# Patient Record
Sex: MALE | Race: WHITE | Employment: FULL TIME | URBAN - METROPOLITAN AREA
[De-identification: names, ages, dates, MRNs, and addresses within clinical notes are randomized per-mention and may not be internally consistent; named-entity substitution may affect disease eponyms.]

---
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
## 2021-06-23 ENCOUNTER — OFFICE VISIT (OUTPATIENT)
Dept: FAMILY MEDICINE CLINIC | Age: 21
End: 2021-06-23
Payer: COMMERCIAL

## 2021-06-23 VITALS
BODY MASS INDEX: 27.2 KG/M2 | HEART RATE: 75 BPM | HEIGHT: 70 IN | SYSTOLIC BLOOD PRESSURE: 121 MMHG | DIASTOLIC BLOOD PRESSURE: 74 MMHG | WEIGHT: 190 LBS | OXYGEN SATURATION: 97 % | TEMPERATURE: 98.6 F

## 2021-06-23 DIAGNOSIS — J02.9 PHARYNGITIS, UNSPECIFIED ETIOLOGY: ICD-10-CM

## 2021-06-23 DIAGNOSIS — J02.9 SORE THROAT: Primary | ICD-10-CM

## 2021-06-23 PROCEDURE — 99213 OFFICE O/P EST LOW 20 MIN: CPT | Performed by: NURSE PRACTITIONER

## 2021-06-23 RX ORDER — LIDOCAINE HYDROCHLORIDE 20 MG/ML
SOLUTION ORAL; TOPICAL
COMMUNITY
Start: 2021-06-04 | End: 2021-11-16 | Stop reason: ALTCHOICE

## 2021-06-23 RX ORDER — AZITHROMYCIN 250 MG/1
TABLET, FILM COATED ORAL
Qty: 6 TABLET | Refills: 0 | Status: SHIPPED | OUTPATIENT
Start: 2021-06-23 | End: 2021-11-16 | Stop reason: ALTCHOICE

## 2021-06-23 NOTE — PROGRESS NOTES
TRUDI Trammell is a 22 y.o. male and presents today for Sore Throat (1 week)  About 2-1/2 weeks ago he was seen in an urgent care in the [facility] and diagnosed with strep. He states he had a positive RST in office there. He was treated with Augmentin, 10-day course. He states that he took the full course of antibiotics and feels like within 2 days his throat started hurting again, about the same as it did initially. Was also given lidocaine viscous solution and does not like it because it tastes terrible. He has no other complaints. He denies cough, congestion, fevers, ear pain. He said no sick contacts outside of himself. Recent Travel Screening and Travel History documentation   Travel Screening     Question   Response    In the last month, have you been in contact with someone who was confirmed or suspected to have Coronavirus / COVID-19? No / Unsure    Have you had a COVID-19 viral test in the last 14 days? No    Do you have any of the following new or worsening symptoms? Have you traveled internationally or domestically in the last month? No      Travel History   Travel since 05/23/21     No documented travel since 05/23/21          Screening  On the basis of positive PHQ-9 screening ( ), C-SSRS completed. Patient will follow-up as needed/as directed with PCP. Allergies  No Known Allergies     Medications  Current Outpatient Medications   Medication Sig Dispense    Lidocaine Viscous 2 % solution      azithromycin (ZITHROMAX) 250 mg tablet Take 2 tablets today, then take 1 tablet daily 6 Tablet    Eliquis 5 mg tablet Take 1 Tab by mouth two (2) times a day.  ibuprofen (MOTRIN) 200 mg tablet Take 2 Tabs by mouth every four to six (4-6) hours as needed.  bacitracin zinc (BACITRACIN) ointment Apply  to affected area daily. (Patient not taking: Reported on 6/23/2021) 15 g     No current facility-administered medications for this visit.         Problem List  Patient Active Problem List    Diagnosis Date Noted    AVM (arteriovenous malformation) 08/05/2020        Vitals  Visit Vitals  /74   Pulse 75   Temp 98.6 °F (37 °C)   Ht 5' 10\" (1.778 m)   Wt 190 lb (86.2 kg)   SpO2 97%   BMI 27.26 kg/m²        ROS  Review of Systems   Constitutional: Negative for chills, fever and malaise/fatigue. HENT: Positive for sore throat. Negative for congestion, ear pain and sinus pain. Eyes: Negative for blurred vision and redness. Respiratory: Negative for cough, sputum production, shortness of breath and wheezing. Cardiovascular: Negative for chest pain, palpitations and leg swelling. Gastrointestinal: Negative for abdominal pain, constipation, diarrhea, heartburn, nausea and vomiting. Genitourinary: Negative for dysuria and hematuria. Musculoskeletal: Negative for falls, joint pain and myalgias. Skin: Negative for rash. Neurological: Negative for dizziness, tingling, tremors, seizures, weakness and headaches. Endo/Heme/Allergies: Does not bruise/bleed easily. Psychiatric/Behavioral: Negative for depression, hallucinations, memory loss and suicidal ideas. The patient is not nervous/anxious and does not have insomnia. Physical Exam  Physical Exam  Vitals and nursing note reviewed. Constitutional:       General: He is awake. He is not in acute distress. Appearance: Normal appearance. He is not ill-appearing or toxic-appearing. HENT:      Head: Normocephalic and atraumatic. Right Ear: Tympanic membrane, ear canal and external ear normal. There is no impacted cerumen. Left Ear: Tympanic membrane, ear canal and external ear normal. There is no impacted cerumen. Nose: Nose normal. No congestion or rhinorrhea. Right Sinus: No maxillary sinus tenderness or frontal sinus tenderness. Left Sinus: No maxillary sinus tenderness or frontal sinus tenderness.       Mouth/Throat:      Mouth: Mucous membranes are moist.      Pharynx: Oropharyngeal exudate and posterior oropharyngeal erythema present. Tonsils: 1+ on the right. 1+ on the left. Eyes:      General:         Right eye: No discharge. Left eye: No discharge. Extraocular Movements: Extraocular movements intact. Conjunctiva/sclera: Conjunctivae normal.   Cardiovascular:      Rate and Rhythm: Normal rate and regular rhythm. Pulses: Normal pulses. Heart sounds: Normal heart sounds. Pulmonary:      Effort: Pulmonary effort is normal. No respiratory distress. Breath sounds: Normal breath sounds. No stridor. No wheezing, rhonchi or rales. Chest:      Chest wall: No tenderness. Musculoskeletal:         General: No swelling. Normal range of motion. Cervical back: Normal range of motion and neck supple. Right lower leg: No edema. Left lower leg: No edema. Skin:     General: Skin is warm and dry. Capillary Refill: Capillary refill takes less than 2 seconds. Coloration: Skin is not jaundiced or pale. Findings: No bruising, erythema, lesion or rash. Neurological:      General: No focal deficit present. Mental Status: He is alert and oriented to person, place, and time. Cranial Nerves: No cranial nerve deficit. Motor: No weakness. Gait: Gait normal.   Psychiatric:         Mood and Affect: Mood normal.         Behavior: Behavior normal.         Thought Content: Thought content normal.         Judgment: Judgment normal.          Assessment/Plan  1. Sore throat  In office RST negative; his throat is very red and there is drainage noted around his tonsils. I am going to obtain an additional swab and send off for strep culture. We will call with results  - AMB POC RAPID STREP A  - STREP AG SCREEN, GROUP A    2. Pharyngitis, unspecified etiology  Because of his recent strep infection, I am going to go ahead and start him on an additional antibiotic. He already had Augmentin. We will try azithromycin.   We will see what his strep culture says. Home hygiene discussed. May use Tylenol as per package instructions for throat pain. He may also use do warm salt water gargles, lozenges or Chloraseptic spray. Also asked patient to increase fluids. - azithromycin (ZITHROMAX) 250 mg tablet; Take 2 tablets today, then take 1 tablet daily  Dispense: 6 Tablet; Refill: 0       Reviewed with him/her that COVID-19 pandemic is an evolving situation with rapidly changing recommendations & guidelines. Medical decisions are made based on the the best information available at the time. Recommended he/she stay tuned for updates published by trusted sources and to advise your PCP of any unexpected changes in clinical condition    Disclaimer:  I have discussed the diagnosis with the patient and the intended plan as seen above. The patient understands our medical plan. The risks, benefits and significant side effects of all medications have been reviewed. Anticipated time course and progression of condition reviewed. All questions have been addressed. He/She received an after visit summary, with information reviewed, and questions answered. Where appropriate, he/she is instructed to call the clinic if he/she has not been notified either by phone or through [address] with test results. The patient  is to call if his condition worsens or fails to improve or if significant side effects are experienced.        Shanta Chirinos, [facility]

## 2021-06-23 NOTE — PROGRESS NOTES
Shana Boone presents today for   Chief Complaint   Patient presents with    Sore Throat     1 week       Is someone accompanying this pt? No        Is the patient using any DME equipment during OV? no    Depression Screening:  3 most recent PHQ Screens 6/23/2021   Little interest or pleasure in doing things Not at all   Feeling down, depressed, irritable, or hopeless Not at all   Total Score PHQ 2 0       Learning Assessment:  Learning Assessment 8/5/2020   PRIMARY LEARNER Patient   HIGHEST LEVEL OF EDUCATION - PRIMARY LEARNER  SOME COLLEGE   BARRIERS PRIMARY LEARNER NONE   CO-LEARNER CAREGIVER No   PRIMARY LANGUAGE ENGLISH   LEARNER PREFERENCE PRIMARY LISTENING   ANSWERED BY patient   RELATIONSHIP SELF         Health Maintenance reviewed and discussed and ordered per Provider. Health Maintenance Due   Topic Date Due    Hepatitis C Screening  Never done    DTaP/Tdap/Td series (1 - Tdap) Never done    HPV Age 9Y-34Y (3 - Male 2-dose series) Never done    COVID-19 Vaccine (1) Never done   . Coordination of Care:  1. Have you been to the ER, urgent care clinic since your last visit? Hospitalized since your last visit? no    2. Have you seen or consulted any other health care providers outside of the [address] [place] [address] since your last visit? Include any pap smears or colon screening.  no

## 2021-06-28 LAB — SPECIMEN STATUS REPORT, ROLRST: NORMAL

## 2021-11-16 ENCOUNTER — OFFICE VISIT (OUTPATIENT)
Dept: INTERNAL MEDICINE CLINIC | Age: 21
End: 2021-11-16
Payer: COMMERCIAL

## 2021-11-16 VITALS
TEMPERATURE: 98.9 F | RESPIRATION RATE: 18 BRPM | OXYGEN SATURATION: 96 % | WEIGHT: 172.2 LBS | HEART RATE: 101 BPM | HEIGHT: 70 IN | SYSTOLIC BLOOD PRESSURE: 131 MMHG | DIASTOLIC BLOOD PRESSURE: 73 MMHG | BODY MASS INDEX: 24.65 KG/M2

## 2021-11-16 DIAGNOSIS — F90.0 ATTENTION DEFICIT HYPERACTIVITY DISORDER (ADHD), PREDOMINANTLY INATTENTIVE TYPE: Primary | ICD-10-CM

## 2021-11-16 DIAGNOSIS — Z72.0 TOBACCO ABUSE: ICD-10-CM

## 2021-11-16 PROCEDURE — 99213 OFFICE O/P EST LOW 20 MIN: CPT | Performed by: INTERNAL MEDICINE

## 2021-11-16 NOTE — PROGRESS NOTES
Patient states that in morning when he wakes up his mind is going, states he can't sleep at night because he is thinking about what he has to do the next day. States he feels like he may have ADHD. States he can not slow his mind down and relax. Ayan Kulkarni presents today for racing thoughts, lack of focus    Is someone accompanying this pt? no  Is the patient using any DME equipment during [address]? no    Depression Screening:  3 most recent PHQ Screens 11/16/2021   Little interest or pleasure in doing things Not at all   Feeling down, depressed, irritable, or hopeless Not at all   Total Score PHQ 2 0       Learning Assessment:  Learning Assessment 8/5/2020   PRIMARY LEARNER Patient   HIGHEST LEVEL OF EDUCATION - PRIMARY LEARNER  SOME COLLEGE   BARRIERS PRIMARY LEARNER NONE   CO-LEARNER CAREGIVER No   PRIMARY LANGUAGE ENGLISH   LEARNER PREFERENCE PRIMARY LISTENING   ANSWERED BY patient   RELATIONSHIP SELF         Health Maintenance reviewed and discussed and ordered per Provider. Health Maintenance Due   Topic Date Due    Hepatitis C Screening  Never done    DTaP/Tdap/Td series (1 - Tdap) Never done    HPV Age 9Y-34Y (3 - Male 2-dose series) Never done    COVID-19 Vaccine (1) Never done    Flu Vaccine (1) 09/01/2021   . Coordination of Care:  1. \"Have you been to the ER, urgent care clinic since your last visit? Hospitalized since your last visit? \" No    2. \"Have you seen or consulted any other health care providers outside of the [address] [place] [address] since your last visit? \" No

## 2021-11-16 NOTE — PROGRESS NOTES
1. Attention deficit hyperactivity disorder (ADHD), predominantly inattentive type  He has all the symptoms of ADHD with inattentive symptoms. However given his age and background I would like to have him formally evaluated by psychiatry. I think this would be most appropriate given his age. We are placing a consult with psychiatry in [address]    2. Tobacco abuse  Encouraged cessation    11/4/21 Dr. Mitchell Mcardle note reviewed  Cc: lack of attention     HPI   This is a very pleasant 52-year-old gentleman with a history of AVMs/KTS who presents complaining of 1 year of inattentive symptoms. He reports sometimes he will just obsess about things and lose track of what he was doing. He reports sometimes he will go to bed and he just keeps thinking about things over and over again until he falls asleep. He denies being depressed. He denies SI or HI. He denies any headaches or new vision changes as well as no dizziness. He has no chest pain palpitations or shortness of breath. He reports he thinks this may have gone on even before a year ago but he is not really sure. Patient Active Problem List   Diagnosis Code    AVM (arteriovenous malformation) Q27.30        Current Outpatient Medications on File Prior to Visit   Medication Sig Dispense Refill    Eliquis 5 mg tablet Take 1 Tab by mouth two (2) times a day.  ibuprofen (MOTRIN) 200 mg tablet Take 2 Tabs by mouth every four to six (4-6) hours as needed. No current facility-administered medications on file prior to visit.         ROS  - GEN: no weight gain/loss, no fevers or chills  - HEENT: no vision changes, no tinnitus, no sore throat    - Neuro: no resting tremors, parasthesia in extremities, no headaches  - MS: No weakness in extremities, no gait abnormalities  - Psych: negative for depression or anxiety + inattentiveness      Visit Vitals  /73   Pulse (!) 101   Temp 98.9 °F (37.2 °C)   Resp 18   Ht 5' 10\" (1.778 m) Wt 172 lb 3.2 oz (78.1 kg)   SpO2 96%   BMI 24.71 kg/m²           Physical Exam  Constitutional:       Appearance: Normal appearance. Normal weight. NAD and pleasant  HENT:      Head: Normocephalic. Nose: Nose normal.      Mouth/Throat:     Eyes:      Extraocular Movements: Extraocular movements intact. Conjunctiva/sclera: Conjunctivae normal. Sclera anicteric      Cardiovascular:      Rate and Rhythm: Normal rate and regular rhythm. Pulses: Normal pulses. Pulmonary:      Effort: No respiratory distress. Breath sounds: CTAB and No stridor. No rhonchi.    Psychiatry     Calm, normal affect, interacting normally

## 2022-03-18 PROBLEM — Q27.30 AVM (ARTERIOVENOUS MALFORMATION): Status: ACTIVE | Noted: 2020-08-05

## 2022-07-07 ENCOUNTER — HOSPITAL ENCOUNTER (EMERGENCY)
Age: 22
Discharge: HOME | End: 2022-07-08
Attending: EMERGENCY MEDICINE
Payer: COMMERCIAL

## 2022-07-07 DIAGNOSIS — N30.00 ACUTE CYSTITIS WITHOUT HEMATURIA: Primary | ICD-10-CM

## 2022-07-07 DIAGNOSIS — M54.50 ACUTE BILATERAL LOW BACK PAIN WITHOUT SCIATICA: ICD-10-CM

## 2022-07-07 LAB
APPEARANCE UR: CLEAR
BILIRUB UR QL: NEGATIVE
COLOR UR: YELLOW
GLUCOSE UR STRIP.AUTO-MCNC: NEGATIVE MG/DL
HGB UR QL STRIP: ABNORMAL
KETONES UR QL STRIP.AUTO: NEGATIVE MG/DL
LEUKOCYTE ESTERASE UR QL STRIP.AUTO: NEGATIVE
NITRITE UR QL STRIP.AUTO: NEGATIVE
PH UR STRIP: 6.5 [PH] (ref 5–8)
PROT UR STRIP-MCNC: NEGATIVE MG/DL
SP GR UR REFRACTOMETRY: 1.01 (ref 1–1.03)
UROBILINOGEN UR QL STRIP.AUTO: 1 EU/DL (ref 0.2–1)

## 2022-07-07 PROCEDURE — 99283 EMERGENCY DEPT VISIT LOW MDM: CPT

## 2022-07-07 PROCEDURE — 81001 URINALYSIS AUTO W/SCOPE: CPT

## 2022-07-07 PROCEDURE — 87086 URINE CULTURE/COLONY COUNT: CPT

## 2022-07-08 VITALS
BODY MASS INDEX: 25.77 KG/M2 | RESPIRATION RATE: 16 BRPM | HEART RATE: 74 BPM | WEIGHT: 180 LBS | OXYGEN SATURATION: 98 % | DIASTOLIC BLOOD PRESSURE: 72 MMHG | TEMPERATURE: 98.9 F | SYSTOLIC BLOOD PRESSURE: 118 MMHG | HEIGHT: 70 IN

## 2022-07-08 LAB
BACTERIA URNS QL MICRO: SLIGHT /HPF
EPITH CASTS URNS QL MICRO: NORMAL /LPF (ref 0–20)
RBC #/AREA URNS HPF: NORMAL /HPF (ref 0–2)
WBC URNS QL MICRO: NORMAL /HPF (ref 0–4)

## 2022-07-08 PROCEDURE — 74011000250 HC RX REV CODE- 250: Performed by: EMERGENCY MEDICINE

## 2022-07-08 PROCEDURE — 74011250637 HC RX REV CODE- 250/637: Performed by: EMERGENCY MEDICINE

## 2022-07-08 RX ORDER — LIDOCAINE 4 G/100G
1 PATCH TOPICAL EVERY 24 HOURS
Status: DISCONTINUED | OUTPATIENT
Start: 2022-07-08 | End: 2022-07-08 | Stop reason: HOSPADM

## 2022-07-08 RX ORDER — METHOCARBAMOL 750 MG/1
750 TABLET, FILM COATED ORAL 4 TIMES DAILY
Qty: 20 TABLET | Refills: 0 | Status: SHIPPED | OUTPATIENT
Start: 2022-07-08

## 2022-07-08 RX ORDER — CEPHALEXIN 500 MG/1
500 CAPSULE ORAL 4 TIMES DAILY
Qty: 28 CAPSULE | Refills: 0 | Status: SHIPPED | OUTPATIENT
Start: 2022-07-08 | End: 2022-07-15

## 2022-07-08 RX ORDER — LIDOCAINE 50 MG/G
PATCH TOPICAL
Qty: 1 EACH | Refills: 0 | Status: SHIPPED | OUTPATIENT
Start: 2022-07-08

## 2022-07-08 RX ORDER — HYDROCODONE BITARTRATE AND ACETAMINOPHEN 5; 325 MG/1; MG/1
1 TABLET ORAL
Status: COMPLETED | OUTPATIENT
Start: 2022-07-08 | End: 2022-07-08

## 2022-07-08 RX ADMIN — HYDROCODONE BITARTRATE AND ACETAMINOPHEN 1 TABLET: 5; 325 TABLET ORAL at 00:26

## 2022-07-08 NOTE — ED TRIAGE NOTES
Pt ambulatory from [facility] with steady gait, c/o bilat low back pain which started this afternoon with chills and temp of aprox 99. No medication prior to arrival, describes urine as cloudy.

## 2022-07-08 NOTE — ED PROVIDER NOTES
The patient is a 81-year-old male with past medical history significant for KTS syndrome, who presents to the ED today with back pain that has been hurting since lunchtime. He is also having chills since then. He additionally has had cloudy urine. He states that he has spent most of the day sitting in a tractor with the air conditioning. He was only out of the air conditioning in the heat for approximately 10 minutes today. He denies any heavy lifting. He does state that he did not drink enough water. Past Medical History:   Diagnosis Date    Vascular abnormality        Past Surgical History:   Procedure Laterality Date    HX OTHER SURGICAL      BB removed from hand    HX WISDOM TEETH EXTRACTION           Family History:   Problem Relation Age of Onset    No Known Problems Mother     No Known Problems Father        Social History     Socioeconomic History    Marital status: SINGLE     Spouse name: Not on file    Number of children: Not on file    Years of education: Not on file    Highest education level: Not on file   Occupational History    Not on file   Tobacco Use    Smoking status: Never Smoker    Smokeless tobacco: Current User     Types: Snuff   Vaping Use    Vaping Use: Some days    Substances: Nicotine    Devices: Pre-filled pod   Substance and Sexual Activity    Alcohol use: Never     Comment: occ    Drug use: Never    Sexual activity: Yes     Partners: Female   Other Topics Concern    Not on file   Social History Narrative    Not on file     Social Determinants of Health     Financial Resource Strain:     Difficulty of Paying Living Expenses: Not on file   Food Insecurity:     Worried About Running Out of Food in the Last Year: Not on file    True of Food in the Last Year: Not on file   Transportation Needs:     Lack of Transportation (Medical): Not on file    Lack of Transportation (Non-Medical):  Not on file   Physical Activity:     Days of Exercise per Week: Not on file    Minutes of Exercise per Session: Not on file   Stress:     Feeling of Stress : Not on file   Social Connections:     Frequency of Communication with Friends and Family: Not on file    Frequency of Social Gatherings with Friends and Family: Not on file    Attends Taoism Services: Not on file    Active Member of [address] [place] [address] or Organizations: Not on file    Attends Club or Organization Meetings: Not on file    Marital Status: Not on file   Intimate Partner Violence:     Fear of Current or Ex-Partner: Not on file    Emotionally Abused: Not on file    Physically Abused: Not on file    Sexually Abused: Not on file   Housing Stability:     Unable to Pay for Housing in the Last Year: Not on file    Number of [place] in the Last Year: Not on file    Unstable Housing in the Last Year: Not on file         ALLERGIES: Patient has no known allergies. Review of Systems   All other systems reviewed and are negative. Vitals:    07/07/22 2233   BP: 123/79   Pulse: 72   Resp: 18   Temp: 98.9 °F (37.2 °C)   SpO2: 97%   Weight: 81.6 kg (180 lb)   Height: 5' 10\" (1.778 m)            Physical Exam  Vitals and nursing note reviewed. Constitutional:       Appearance: Normal appearance. HENT:      Head: Normocephalic and atraumatic. Right Ear: External ear normal.      Left Ear: External ear normal.      Nose: Nose normal.      Mouth/Throat:      Mouth: Mucous membranes are moist.      Pharynx: Oropharynx is clear. Eyes:      Extraocular Movements: Extraocular movements intact. Conjunctiva/sclera: Conjunctivae normal.      Pupils: Pupils are equal, round, and reactive to light. Cardiovascular:      Rate and Rhythm: Normal rate and regular rhythm. Pulses: Normal pulses. Heart sounds: Normal heart sounds. Pulmonary:      Effort: Pulmonary effort is normal.      Breath sounds: Normal breath sounds. Abdominal:      General: Abdomen is flat.  Bowel sounds are normal. Palpations: Abdomen is soft. Musculoskeletal:         General: Tenderness present. Normal range of motion. Cervical back: Normal range of motion and neck supple. Comments: Bilateral lumbar musculature tenderness to palpation. Skin:     General: Skin is warm and dry. Capillary Refill: Capillary refill takes less than 2 seconds. Neurological:      General: No focal deficit present. Mental Status: He is alert and oriented to person, place, and time. Psychiatric:         Mood and Affect: Mood normal.         Behavior: Behavior normal.         Thought Content: Thought content normal.         Judgment: Judgment normal.        Recent Results (from the past 12 hour(s))   URINALYSIS W/ RFLX MICROSCOPIC    Collection Time: 07/07/22 10:33 PM   Result Value Ref Range    Color Yellow      Appearance Clear      Specific gravity 1.012 1.005 - 1.030      pH (UA) 6.5 5.0 - 8.0      Protein Negative Negative mg/dL    Glucose Negative Negative mg/dL    Ketone Negative Negative mg/dL    Bilirubin Negative Negative      Blood Small (A) Negative      Urobilinogen 1.0 0.2 - 1.0 EU/dL    Nitrites Negative Negative      Leukocyte Esterase Negative Negative     URINE MICROSCOPIC    Collection Time: 07/07/22 10:33 PM   Result Value Ref Range    WBC 0-4 0 - 4 /hpf    RBC 0-5 0 - 2 /hpf    Epithelial cells Few 0 - 20 /lpf    Bacteria SLIGHT None /hpf       MDM  Number of Diagnoses or Management Options  Acute bilateral low back pain without sciatica  Acute cystitis without hematuria  Diagnosis management comments: The patient is a 60-year-old male who has a history of KTS syndrome, who presents to the ED today with low back pain and chills that began earlier this afternoon. The patient's UA has slight bacteria. He also has some musculoskeletal tenderness to palpation in his low back. He received Norco and Lidoderm in the ED. I discussed waiting for a urine culture versus treating for UTI.   The patient elected to take antibiotics. He will be discharged home with prescriptions for Lidoderm, Keflex, and Robaxin. He has been advised to follow-up with his primary care physician in 2 to 3 days. Return precautions have been given.          Procedures

## 2022-07-09 LAB
BACTERIA SPEC CULT: NORMAL
SPECIAL REQUESTS,SREQ: NORMAL

## 2023-01-12 ENCOUNTER — HOSPITAL ENCOUNTER (EMERGENCY)
Age: 23
Discharge: HOME | End: 2023-01-12
Attending: EMERGENCY MEDICINE
Payer: COMMERCIAL

## 2023-01-12 VITALS
TEMPERATURE: 98.8 F | OXYGEN SATURATION: 97 % | HEIGHT: 70 IN | RESPIRATION RATE: 16 BRPM | SYSTOLIC BLOOD PRESSURE: 116 MMHG | BODY MASS INDEX: 25.77 KG/M2 | HEART RATE: 62 BPM | WEIGHT: 180 LBS | DIASTOLIC BLOOD PRESSURE: 57 MMHG

## 2023-01-12 DIAGNOSIS — K27.4 PEPTIC ULCER DISEASE WITH HEMORRHAGE: Primary | ICD-10-CM

## 2023-01-12 DIAGNOSIS — Z79.01 ANTICOAGULATED: ICD-10-CM

## 2023-01-12 LAB
ALBUMIN SERPL-MCNC: 4.1 G/DL (ref 3.4–5)
ALBUMIN/GLOB SERPL: 1.2 (ref 0.8–1.7)
ALP SERPL-CCNC: 71 U/L (ref 45–117)
ALT SERPL-CCNC: 29 U/L (ref 16–61)
ANION GAP SERPL CALC-SCNC: 7 MMOL/L (ref 3–18)
AST SERPL W P-5'-P-CCNC: 13 U/L (ref 10–38)
BASOPHILS # BLD: 0 K/UL (ref 0–0.1)
BASOPHILS NFR BLD: 0 % (ref 0–2)
BILIRUB SERPL-MCNC: 1.1 MG/DL (ref 0.2–1)
BUN SERPL-MCNC: 19 MG/DL (ref 7–18)
BUN/CREAT SERPL: 26 (ref 12–20)
CA-I BLD-MCNC: 9.5 MG/DL (ref 8.5–10.1)
CHLORIDE SERPL-SCNC: 105 MMOL/L (ref 100–111)
CO2 SERPL-SCNC: 27 MMOL/L (ref 21–32)
CREAT SERPL-MCNC: 0.74 MG/DL (ref 0.6–1.3)
DIFFERENTIAL METHOD BLD: ABNORMAL
EOSINOPHIL # BLD: 0.2 K/UL (ref 0–0.4)
EOSINOPHIL NFR BLD: 3 % (ref 0–5)
ERYTHROCYTE [DISTWIDTH] IN BLOOD BY AUTOMATED COUNT: 12.5 % (ref 11.6–14.5)
GLOBULIN SER CALC-MCNC: 3.5 G/DL (ref 2–4)
GLUCOSE SERPL-MCNC: 83 MG/DL (ref 74–99)
HCT VFR BLD AUTO: 44.3 % (ref 36–48)
HGB BLD-MCNC: 14.8 G/DL (ref 13–16)
IMM GRANULOCYTES # BLD AUTO: 0 K/UL (ref 0–0.04)
IMM GRANULOCYTES NFR BLD AUTO: 0 % (ref 0–0.5)
LIPASE SERPL-CCNC: 50 U/L (ref 73–393)
LYMPHOCYTES # BLD: 0.9 K/UL (ref 0.9–3.6)
LYMPHOCYTES NFR BLD: 12 % (ref 21–52)
MCH RBC QN AUTO: 29.3 PG (ref 24–34)
MCHC RBC AUTO-ENTMCNC: 33.4 G/DL (ref 31–37)
MCV RBC AUTO: 87.7 FL (ref 78–100)
MONOCYTES # BLD: 0.8 K/UL (ref 0.05–1.2)
MONOCYTES NFR BLD: 10 % (ref 3–10)
NEUTS SEG # BLD: 5.7 K/UL (ref 1.8–8)
NEUTS SEG NFR BLD: 75 % (ref 40–73)
NRBC # BLD: 0 K/UL (ref 0–0.01)
NRBC BLD-RTO: 0 PER 100 WBC
PLATELET # BLD AUTO: 251 K/UL (ref 135–420)
PMV BLD AUTO: 10.3 FL (ref 9.2–11.8)
POTASSIUM SERPL-SCNC: 3.7 MMOL/L (ref 3.5–5.5)
PROT SERPL-MCNC: 7.6 G/DL (ref 6.4–8.2)
RBC # BLD AUTO: 5.05 M/UL (ref 4.35–5.65)
SODIUM SERPL-SCNC: 139 MMOL/L (ref 136–145)
WBC # BLD AUTO: 7.6 K/UL (ref 4.6–13.2)

## 2023-01-12 PROCEDURE — 96375 TX/PRO/DX INJ NEW DRUG ADDON: CPT

## 2023-01-12 PROCEDURE — 83690 ASSAY OF LIPASE: CPT

## 2023-01-12 PROCEDURE — 85025 COMPLETE CBC W/AUTO DIFF WBC: CPT

## 2023-01-12 PROCEDURE — 96374 THER/PROPH/DIAG INJ IV PUSH: CPT

## 2023-01-12 PROCEDURE — 80053 COMPREHEN METABOLIC PANEL: CPT

## 2023-01-12 PROCEDURE — 74011250636 HC RX REV CODE- 250/636: Performed by: EMERGENCY MEDICINE

## 2023-01-12 PROCEDURE — C9113 INJ PANTOPRAZOLE SODIUM, VIA: HCPCS | Performed by: EMERGENCY MEDICINE

## 2023-01-12 PROCEDURE — 99284 EMERGENCY DEPT VISIT MOD MDM: CPT

## 2023-01-12 RX ORDER — ONDANSETRON 2 MG/ML
4 INJECTION INTRAMUSCULAR; INTRAVENOUS ONCE
Status: COMPLETED | OUTPATIENT
Start: 2023-01-12 | End: 2023-01-12

## 2023-01-12 RX ORDER — ONDANSETRON 4 MG/1
4 TABLET, ORALLY DISINTEGRATING ORAL
Qty: 20 TABLET | Refills: 0 | Status: SHIPPED | OUTPATIENT
Start: 2023-01-12

## 2023-01-12 RX ORDER — PANTOPRAZOLE SODIUM 40 MG/1
40 TABLET, DELAYED RELEASE ORAL DAILY
Qty: 20 TABLET | Refills: 0 | Status: SHIPPED | OUTPATIENT
Start: 2023-01-12 | End: 2023-02-01

## 2023-01-12 RX ORDER — PANTOPRAZOLE SODIUM 40 MG/10ML
80 INJECTION, POWDER, LYOPHILIZED, FOR SOLUTION INTRAVENOUS
Status: COMPLETED | OUTPATIENT
Start: 2023-01-12 | End: 2023-01-12

## 2023-01-12 RX ADMIN — ONDANSETRON 4 MG: 2 INJECTION INTRAMUSCULAR; INTRAVENOUS at 12:35

## 2023-01-12 RX ADMIN — PANTOPRAZOLE SODIUM 80 MG: 40 INJECTION, POWDER, FOR SOLUTION INTRAVENOUS at 12:35

## 2023-01-12 NOTE — Clinical Note
[facility] EMERGENCY DEPT  [address] [id number] [phone]    Work/School Note    Date: 1/12/2023    To Whom It May concern:    Franc Garcia was seen and treated today in the emergency room by the following provider(s):  Attending Provider: Zander Santos MD.      Franc Garcia is excused from work/school on 01/12/23 and 01/13/23. He is medically clear to return to work/school on 1/14/2023.        Sincerely,          Flores Garg MD

## 2023-01-12 NOTE — ED PROVIDER NOTES
EMERGENCY DEPARTMENT HISTORY AND PHYSICAL EXAM      Date: 1/12/2023  Patient Name: Franc Garcia    History of Presenting Illness     Chief Complaint   Patient presents with    Vomiting       History Provided By: Patient    HPI: Franc Garcia, 25 y.o. male  presents to the ED with cc of left upper quadrant and epigastric discomfort. Started 4 days ago. Patient has been nauseous and threw up black emesis this morning. He states his stools are normal.  No black stools, bright red blood per rectum. Patient is on a low-dose of Eliquis because he states he has had a vascular malformation and blood clots in his left leg. He states the started him on Eliquis because his D-dimer was elevated and they wanted to try to decrease his D-dimer. He has been taking ibuprofen for pain. Past History     Past Medical History:  Past Medical History:   Diagnosis Date    Vascular abnormality        Past Surgical History:  Past Surgical History:   Procedure Laterality Date    HX OTHER SURGICAL      BB removed from hand    HX WISDOM TEETH EXTRACTION         Medications:  No current facility-administered medications on file prior to encounter. Current Outpatient Medications on File Prior to Encounter   Medication Sig Dispense Refill    Eliquis 5 mg tablet Take 2.5 mg by mouth two (2) times a day. methocarbamoL (Robaxin-750) 750 mg tablet Take 1 Tablet by mouth four (4) times daily. (Patient not taking: Reported on 1/12/2023) 20 Tablet 0    lidocaine (Lidoderm) 5 % Apply patch to the affected area for 12 hours a day and remove for 12 hours a day. (Patient not taking: Reported on 1/12/2023) 1 Each 0    [DISCONTINUED] ibuprofen (MOTRIN) 200 mg tablet Take 2 Tabs by mouth every four to six (4-6) hours as needed.          Family History:  Family History   Problem Relation Age of Onset    No Known Problems Mother     No Known Problems Father        Social History:  Social History     Tobacco Use    Smoking status: Never Smokeless tobacco: Current     Types: Snuff   Vaping Use    Vaping Use: Some days    Substances: Nicotine    Devices: Pre-filled pod   Substance Use Topics    Alcohol use: Yes     Comment: socially    Drug use: Never       Allergies:  No Known Allergies    All the above components of the past  history are auto-populated from the electronic record. They have been reviewed and the patient has been interviewed for any pertinent past history that pertains to the patient's chief complaint and reason for visit. Not all pre-populated components may be accurate at the time this note was generated. Review of Systems   Review of Systems   Constitutional:  Negative for fever. Respiratory:  Negative for shortness of breath. Cardiovascular:  Negative for chest pain. Gastrointestinal:  Positive for abdominal pain, nausea and vomiting. Negative for anal bleeding, blood in stool, constipation and diarrhea. Genitourinary:  Negative for difficulty urinating. Neurological:  Negative for dizziness and light-headedness. Physical Exam   Physical Exam  Vitals and nursing note reviewed. Constitutional:       General: He is not in acute distress. Appearance: He is well-developed. He is not ill-appearing. Cardiovascular:      Rate and Rhythm: Normal rate and regular rhythm. Pulmonary:      Effort: Pulmonary effort is normal. No accessory muscle usage or respiratory distress. Breath sounds: Normal breath sounds. Skin:     General: Skin is warm and dry. Neurological:      Mental Status: He is alert and oriented to person, place, and time.        Diagnostic Study Results     Labs -     Recent Results (from the past 24 hour(s))   METABOLIC PANEL, COMPREHENSIVE    Collection Time: 01/12/23  9:58 AM   Result Value Ref Range    Sodium 139 136 - 145 mmol/L    Potassium 3.7 3.5 - 5.5 mmol/L    Chloride 105 100 - 111 mmol/L    CO2 27 21 - 32 mmol/L    Anion gap 7 3.0 - 18.0 mmol/L    Glucose 83 74 - 99 mg/dL BUN 19 (H) 7 - 18 mg/dL    Creatinine 0.74 0.60 - 1.30 mg/dL    BUN/Creatinine ratio 26 (H) 12 - 20      eGFR >60 >60 ml/min/1.73m2    Calcium 9.5 8.5 - 10.1 mg/dL    Bilirubin, total 1.1 (H) 0.2 - 1.0 mg/dL    AST (SGOT) 13 10 - 38 U/L    ALT (SGPT) 29 16 - 61 U/L    Alk. phosphatase 71 45 - 117 U/L    Protein, total 7.6 6.4 - 8.2 g/dL    Albumin 4.1 3.4 - 5.0 g/dL    Globulin 3.5 2.0 - 4.0 g/dL    A-G Ratio 1.2 0.8 - 1.7     LIPASE    Collection Time: 01/12/23  9:58 AM   Result Value Ref Range    Lipase 50 (L) 73 - 393 U/L   CBC WITH AUTOMATED DIFF    Collection Time: 01/12/23  9:58 AM   Result Value Ref Range    WBC 7.6 4.6 - 13.2 K/uL    RBC 5.05 4.35 - 5.65 M/uL    HGB 14.8 13.0 - 16.0 g/dL    HCT 44.3 36.0 - 48.0 %    MCV 87.7 78.0 - 100.0 FL    MCH 29.3 24.0 - 34.0 PG    MCHC 33.4 31.0 - 37.0 g/dL    RDW 12.5 11.6 - 14.5 %    PLATELET 939 079 - 323 K/uL    MPV 10.3 9.2 - 11.8 FL    NRBC 0.0 0.0  WBC    ABSOLUTE NRBC 0.00 0.00 - 0.01 K/uL    NEUTROPHILS 75 (H) 40 - 73 %    LYMPHOCYTES 12 (L) 21 - 52 %    MONOCYTES 10 3 - 10 %    EOSINOPHILS 3 0 - 5 %    BASOPHILS 0 0 - 2 %    IMMATURE GRANULOCYTES 0 0 - 0.5 %    ABS. NEUTROPHILS 5.7 1.8 - 8.0 K/UL    ABS. LYMPHOCYTES 0.9 0.9 - 3.6 K/UL    ABS. MONOCYTES 0.8 0.05 - 1.2 K/UL    ABS. EOSINOPHILS 0.2 0.0 - 0.4 K/UL    ABS. BASOPHILS 0.0 0.0 - 0.1 K/UL    ABS. IMM. GRANS. 0.0 0.00 - 0.04 K/UL    DF AUTOMATED         Radiologic Studies -   No orders to display     CT Results  (Last 48 hours)      None          CXR Results  (Last 48 hours)      None              Medical Decision Making     I reviewed the vital signs, available nursing notes, past medical history, past surgical history, family history and social history. Vital Signs-I have reviewed the vital signs that have been made available during the patient's emergency department visit.   The vital signs auto-populated below are obtained mostly by electronic means through monitoring devices that have been downloaded into the patient's chart by the nursing staff. Some vital signs are not downloaded into the chart until after the patient has been discharged and this note has been completed, therefore some vital signs may not be available to the physician for review prior to patient's discharge or admission. The physician has reviewed the patient's triage vital signs, monitored the electronic monitoring devices remotely for any significant vital sign abnormalities, and have reviewed vital signs prior to discharge. Some vital signs reviewed at bedside or remotely utilizing electronic monitoring devices may be different than the vital signs downloaded into the electronic medical record. Some vital signs may be erroneous and inaccurate since they are obtained by electronic monitoring devices, and not all vital signs are verified for accuracy by nursing staff prior to downloading into the patient's chart. Patient Vitals for the past 24 hrs:   Temp Pulse Resp BP SpO2   01/12/23 0934 98.8 °F (37.1 °C) 64 16 135/72 99 %         Records Reviewed: Nursing notes for today's visit have been reviewed. I have also reviewed most recent medical records pertinent to today's complaints, if available in our medical record system. I have also reviewed all labs and imaging results from previous results in comparison to results obtained today. If an EKG was obtained today, it has been compared to previous EKGs, if available. If arriving via EMS, the EMS report has been reviewed if made available to us within the patient's time in the emergency department. ED Course and Medical Decision Making:       MDM  Number of Diagnoses or Management Options  Anticoagulated  Peptic ulcer disease with hemorrhage: new, needed workup  Diagnosis management comments: Patient presents with upper abdominal pain and vomiting of black emesis suggesting peptic ulcer disease with hemorrhage. He is anticoagulated on Eliquis.   Vital signs are within acceptable ranges. He is not anemic. He is on a low-dose of Eliquis. I do believe that it is safe for him to stop his Eliquis for 3 days. We will start him on a PPI. I do not feel that he needs admission today. May follow-up outpatient with GI. Return if any worsening symptoms. Return especially if he notices any melena or bright red blood per rectum. Amount and/or Complexity of Data Reviewed  Clinical lab tests: ordered and reviewed  Discussion of test results with the performing providers: no  Decide to obtain previous medical records or to obtain history from someone other than the patient: no  Obtain history from someone other than the patient: no  Review and summarize past medical records: yes  Discuss the patient with other providers: no  Independent visualization of images, tracings, or specimens: no    Risk of Complications, Morbidity, and/or Mortality  Presenting problems: moderate  Diagnostic procedures: low  Management options: moderate    Patient Progress  Patient progress: stable      ED Course as of 01/12/23 1219   Thu Jan 12, 2023   1216 CBC WITH AUTOMATED DIFF(!):    WBC 7.6   RBC 5.05   HGB 14.8   HCT 44.3   MCV 87.7   MCH 29.3   MCHC 33.4   RDW 12.5   PLATELET 538   MPV 89.5   NRBC 0.0   ABSOLUTE NRBC 0.00   NEUTROPHILS 75(!)   LYMPHOCYTES 12(!)   MONOCYTES 10   EOSINOPHILS 3   BASOPHILS 0   IMMATURE GRANULOCYTES 0   ABS. NEUTROPHILS 5.7   ABS. LYMPHOCYTES 0.9   ABS. MONOCYTES 0.8   ABS. EOSINOPHILS 0.2   ABS. BASOPHILS 0.0   ABS. IMM. GRANS. 0.0   DF AUTOMATED  Stable CBC. No anemia. No leukocytosis. [WM]   8961 LIPASE(!):    Lipase 50(!)  No evidence of pancreatitis. [WM]   1216 COMPREHENSIVE METABOLIC PANEL(!):    Sodium 139   Potassium 3.7   Chloride 105   CO2 27   Anion gap 7   Glucose 83   BUN 19(!)   Creatinine 0.74   BUN/Creatinine ratio 26(!)   eGFR >60   Calcium 9.5   Bilirubin, total 1.1(!)   AST 13   ALT 29   Alk.  phosphatase 71   Protein, total 7.6   Albumin 4.1 Globulin 3.5   A-G Ratio 1.2  No evidence of electrolyte abnormalities, ROBINSON, or liver disease. [WM]      ED Course User Index  [WM] Tequila Kong MD       Orders Placed This Encounter    COMPREHENSIVE METABOLIC PANEL    LIPASE    CBC WITH AUTOMATED DIFF    SALINE LOCK IV ONE TIME STAT    ondansetron (ZOFRAN) injection 4 mg    pantoprazole (PROTONIX) injection 80 mg    pantoprazole (Protonix) 40 mg tablet    ondansetron (ZOFRAN ODT) 4 mg disintegrating tablet       Procedures      Critical Care Time:   0    Disposition:  Discharge    The patient's emergency department evaluation is now complete. I have reviewed all labs, imaging, and pertinent information. I have discussed all results with the patient and/or family. Based on our evaluation today I do believe that the patient is safe to be discharged home. The patient has been provided with at home instructions that are pertinent to their complaint today, although these may not be specific to the exact diagnosis. I have reviewed the patient's home medications and attempted to reconcile if not already done so by pharmacy or nursing staff. I have discussed all new prescriptions with the patient. The patient has been encouraged to follow-up with primary care doctor and/or specialist, and these have been discussed with the patient. The patient has been advised that they may return to the emergency department if they have any worsening symptoms and or new symptoms that are of concern to them. Verbal discharge instructions may have also been provided to the patient that may not be specifically contained in the written discharge instructions. The patient has been given opportunity to ask questions prior to discharge. PLAN:  1. Current Discharge Medication List        START taking these medications    Details   pantoprazole (Protonix) 40 mg tablet Take 1 Tablet by mouth daily for 20 days.   Qty: 20 Tablet, Refills: 0  Start date: 1/12/2023, End date: 2/1/2023      ondansetron (ZOFRAN ODT) 4 mg disintegrating tablet Take 1 Tablet by mouth every eight (8) hours as needed for Nausea or Vomiting. Qty: 20 Tablet, Refills: 0  Start date: 1/12/2023           2. Follow-up Information       Follow up With Specialties Details Why Contact Info    Marisol Obrien MD Gastroenterology Schedule an appointment as soon as possible for a visit   [address] [facility]  [phone]            Return to ED if worse     Diagnosis     Clinical Impression:   1. Peptic ulcer disease with hemorrhage    2.  Anticoagulated

## 2023-01-12 NOTE — DISCHARGE INSTRUCTIONS
It was a pleasure taking care of you in our Emergency Department today. We know that when you come to [address] [place] [address], you are entrusting us with your health, comfort, and safety. Our physicians and nurses honor that trust, and truly appreciate the opportunity to care for you and your loved ones. We also value your feedback. If you receive a survey about your Emergency Department experience today, please fill it out. We care about our patients' feedback, and we listen to what you have to say. Please read over your discharge instructions as these contain pertinent information to help you in the healing process. These instructions include a list of prescriptions you were given today. Follow-up information is also noted on your discharge papers. There are attached instructions and information pertaining to the reason why you were seen in the emergency department today. These discharge instructions may not be for exactly why you were here, but may be the closest available instructions that we have. These include important advice for things that you can do at home to feel better, and reasons to return to the emergency department. The evaluation and treatment you received in the emergency department is not always definitive care. If follow-up with your primary care doctor or specialist was recommended, it is important that you make these appointments for follow-up care. You may need further testing, procedures, and/or medications to help you feel better. Further tests may be required that are not available in the emergency department. Failure to make these follow-up appointments may jeopardize your health. The emergency department is here for emergent stabilization and evaluation of life and limb threatening illness and/or injuries. Further care through a specialist or primary care doctor may be required to assist in your healing and complete your treatment and/or evaluation.   We may not always be able to make a diagnosis in the emergency department, or things may change that will alter your diagnosis. Our primary goal is to ensure that nothing serious is occurring and that you are stable to continue your treatment and evaluation at home as an outpatient. Of course, if things change, and you feel worse, you are always encouraged to return to the emergency department for re-evaluation. Lab Results Today:  Recent Results (from the past 8 hour(s))   METABOLIC PANEL, COMPREHENSIVE    Collection Time: 01/12/23  9:58 AM   Result Value Ref Range    Sodium 139 136 - 145 mmol/L    Potassium 3.7 3.5 - 5.5 mmol/L    Chloride 105 100 - 111 mmol/L    CO2 27 21 - 32 mmol/L    Anion gap 7 3.0 - 18.0 mmol/L    Glucose 83 74 - 99 mg/dL    BUN 19 (H) 7 - 18 mg/dL    Creatinine 0.74 0.60 - 1.30 mg/dL    BUN/Creatinine ratio 26 (H) 12 - 20      eGFR >60 >60 ml/min/1.73m2    Calcium 9.5 8.5 - 10.1 mg/dL    Bilirubin, total 1.1 (H) 0.2 - 1.0 mg/dL    AST (SGOT) 13 10 - 38 U/L    ALT (SGPT) 29 16 - 61 U/L    Alk. phosphatase 71 45 - 117 U/L    Protein, total 7.6 6.4 - 8.2 g/dL    Albumin 4.1 3.4 - 5.0 g/dL    Globulin 3.5 2.0 - 4.0 g/dL    A-G Ratio 1.2 0.8 - 1.7     LIPASE    Collection Time: 01/12/23  9:58 AM   Result Value Ref Range    Lipase 50 (L) 73 - 393 U/L   CBC WITH AUTOMATED DIFF    Collection Time: 01/12/23  9:58 AM   Result Value Ref Range    WBC 7.6 4.6 - 13.2 K/uL    RBC 5.05 4.35 - 5.65 M/uL    HGB 14.8 13.0 - 16.0 g/dL    HCT 44.3 36.0 - 48.0 %    MCV 87.7 78.0 - 100.0 FL    MCH 29.3 24.0 - 34.0 PG    MCHC 33.4 31.0 - 37.0 g/dL    RDW 12.5 11.6 - 14.5 %    PLATELET 707 153 - 987 K/uL    MPV 10.3 9.2 - 11.8 FL    NRBC 0.0 0.0  WBC    ABSOLUTE NRBC 0.00 0.00 - 0.01 K/uL    NEUTROPHILS 75 (H) 40 - 73 %    LYMPHOCYTES 12 (L) 21 - 52 %    MONOCYTES 10 3 - 10 %    EOSINOPHILS 3 0 - 5 %    BASOPHILS 0 0 - 2 %    IMMATURE GRANULOCYTES 0 0 - 0.5 %    ABS. NEUTROPHILS 5.7 1.8 - 8.0 K/UL    ABS.  LYMPHOCYTES 0.9 0.9 - 3.6 K/UL    ABS. MONOCYTES 0.8 0.05 - 1.2 K/UL    ABS. EOSINOPHILS 0.2 0.0 - 0.4 K/UL    ABS. BASOPHILS 0.0 0.0 - 0.1 K/UL    ABS. IMM. GRANS. 0.0 0.00 - 0.04 K/UL    DF AUTOMATED          Radiology Results Today:  No results found.

## 2023-01-12 NOTE — ED TRIAGE NOTES
Pt states she has had umbilical/generalized abdominal pain x 4 days, states he started vomiting up \"black stuff\" today   Pt does take BID eliquis for vascular issues

## 2023-11-04 ENCOUNTER — HOSPITAL ENCOUNTER (EMERGENCY)
Age: 23
Discharge: HOME | End: 2023-11-04
Attending: FAMILY MEDICINE
Payer: COMMERCIAL

## 2023-11-04 ENCOUNTER — APPOINTMENT (OUTPATIENT)
Age: 23
End: 2023-11-04
Payer: COMMERCIAL

## 2023-11-04 VITALS
WEIGHT: 189 LBS | SYSTOLIC BLOOD PRESSURE: 117 MMHG | DIASTOLIC BLOOD PRESSURE: 77 MMHG | OXYGEN SATURATION: 100 % | RESPIRATION RATE: 18 BRPM | BODY MASS INDEX: 27.06 KG/M2 | TEMPERATURE: 98 F | HEART RATE: 69 BPM | HEIGHT: 70 IN

## 2023-11-04 DIAGNOSIS — J84.10 GRANULOMATOUS LUNG DISEASE (HCC): ICD-10-CM

## 2023-11-04 DIAGNOSIS — R07.89 LEFT-SIDED CHEST WALL PAIN: Primary | ICD-10-CM

## 2023-11-04 PROCEDURE — 6370000000 HC RX 637 (ALT 250 FOR IP): Performed by: FAMILY MEDICINE

## 2023-11-04 PROCEDURE — 6360000004 HC RX CONTRAST MEDICATION: Performed by: FAMILY MEDICINE

## 2023-11-04 PROCEDURE — 71260 CT THORAX DX C+: CPT

## 2023-11-04 PROCEDURE — 99285 EMERGENCY DEPT VISIT HI MDM: CPT

## 2023-11-04 RX ORDER — ACETAMINOPHEN 500 MG
1000 TABLET ORAL
Status: COMPLETED | OUTPATIENT
Start: 2023-11-04 | End: 2023-11-04

## 2023-11-04 RX ORDER — HYDROCODONE BITARTRATE AND ACETAMINOPHEN 5; 325 MG/1; MG/1
1 TABLET ORAL EVERY 8 HOURS PRN
Qty: 5 TABLET | Refills: 0 | Status: SHIPPED | OUTPATIENT
Start: 2023-11-04 | End: 2023-11-11

## 2023-11-04 RX ADMIN — IOPAMIDOL 100 ML: 755 INJECTION, SOLUTION INTRAVENOUS at 11:33

## 2023-11-04 RX ADMIN — ACETAMINOPHEN 1000 MG: 500 TABLET ORAL at 11:48

## 2023-11-04 ASSESSMENT — PAIN SCALES - GENERAL: PAINLEVEL_OUTOF10: 6

## 2023-11-04 ASSESSMENT — PAIN DESCRIPTION - LOCATION: LOCATION: SHOULDER;RIB CAGE

## 2023-11-04 ASSESSMENT — PAIN - FUNCTIONAL ASSESSMENT: PAIN_FUNCTIONAL_ASSESSMENT: 0-10

## 2023-11-04 ASSESSMENT — PAIN DESCRIPTION - ORIENTATION: ORIENTATION: LEFT

## 2023-11-04 NOTE — DISCHARGE INSTRUCTIONS
As we spoke no fractures are noted you have this chronic thickening of your pleura around your lungs. Follow-up with your primary care doctor this may be slightly irritated. Your ribs may also be irritated continue use Tylenol and ibuprofen I will call in some Norco for some breakthrough pain. Use this sparingly. Do not drive or work while taking this medication. Follow-up with your primary care doctor in the next 48 -72 hours return to the emergency department if you have problems with breathing shortness of breath coughing up or spitting up of any blood or if there is any other questions or concerns.

## 2023-11-04 NOTE — ED TRIAGE NOTES
Patient got wedged in  around 1 pm yesterday, left rib pain, left shoulder pain. 9am  took motrin for pain.

## 2025-06-13 ENCOUNTER — HOSPITAL ENCOUNTER (EMERGENCY)
Age: 25
Discharge: HOME | End: 2025-06-13
Attending: EMERGENCY MEDICINE
Payer: COMMERCIAL

## 2025-06-13 ENCOUNTER — APPOINTMENT (OUTPATIENT)
Age: 25
End: 2025-06-13
Payer: COMMERCIAL

## 2025-06-13 VITALS
HEART RATE: 94 BPM | TEMPERATURE: 98.2 F | OXYGEN SATURATION: 96 % | DIASTOLIC BLOOD PRESSURE: 72 MMHG | BODY MASS INDEX: 25.77 KG/M2 | HEIGHT: 70 IN | WEIGHT: 180 LBS | RESPIRATION RATE: 16 BRPM | SYSTOLIC BLOOD PRESSURE: 144 MMHG

## 2025-06-13 DIAGNOSIS — Z79.01 ENCOUNTER FOR CURRENT LONG-TERM USE OF ANTICOAGULANTS: ICD-10-CM

## 2025-06-13 DIAGNOSIS — S01.81XA LACERATION OF FOREHEAD, INITIAL ENCOUNTER: Primary | ICD-10-CM

## 2025-06-13 DIAGNOSIS — S09.90XA INJURY OF HEAD, INITIAL ENCOUNTER: ICD-10-CM

## 2025-06-13 DIAGNOSIS — Q87.2 KLIPPEL-TRENAUNAY SYNDROME: ICD-10-CM

## 2025-06-13 PROCEDURE — 99284 EMERGENCY DEPT VISIT MOD MDM: CPT

## 2025-06-13 PROCEDURE — 90471 IMMUNIZATION ADMIN: CPT | Performed by: EMERGENCY MEDICINE

## 2025-06-13 PROCEDURE — 12001 RPR S/N/AX/GEN/TRNK 2.5CM/<: CPT

## 2025-06-13 PROCEDURE — 70450 CT HEAD/BRAIN W/O DYE: CPT

## 2025-06-13 PROCEDURE — 6360000002 HC RX W HCPCS: Performed by: EMERGENCY MEDICINE

## 2025-06-13 PROCEDURE — 90715 TDAP VACCINE 7 YRS/> IM: CPT | Performed by: EMERGENCY MEDICINE

## 2025-06-13 PROCEDURE — 6370000000 HC RX 637 (ALT 250 FOR IP): Performed by: EMERGENCY MEDICINE

## 2025-06-13 RX ADMIN — TETANUS TOXOID, REDUCED DIPHTHERIA TOXOID AND ACELLULAR PERTUSSIS VACCINE, ADSORBED 0.5 ML: 5; 2.5; 8; 8; 2.5 SUSPENSION INTRAMUSCULAR at 11:14

## 2025-06-13 RX ADMIN — CEPHALEXIN 500 MG: 250 CAPSULE ORAL at 11:17

## 2025-06-13 ASSESSMENT — PAIN DESCRIPTION - LOCATION: LOCATION: HEAD

## 2025-06-13 ASSESSMENT — PAIN SCALES - GENERAL
PAINLEVEL_OUTOF10: 0
PAINLEVEL_OUTOF10: 5

## 2025-06-13 ASSESSMENT — PAIN DESCRIPTION - DESCRIPTORS: DESCRIPTORS: ACHING

## 2025-06-13 ASSESSMENT — LIFESTYLE VARIABLES
HOW MANY STANDARD DRINKS CONTAINING ALCOHOL DO YOU HAVE ON A TYPICAL DAY: 1 OR 2
HOW OFTEN DO YOU HAVE A DRINK CONTAINING ALCOHOL: MONTHLY OR LESS

## 2025-06-13 ASSESSMENT — PAIN DESCRIPTION - ORIENTATION: ORIENTATION: ANTERIOR;LEFT

## 2025-06-13 ASSESSMENT — PAIN - FUNCTIONAL ASSESSMENT: PAIN_FUNCTIONAL_ASSESSMENT: 0-10

## 2025-06-13 NOTE — ED PROVIDER NOTES
EMERGENCY DEPARTMENT HISTORY AND PHYSICAL EXAM    Date: 6/13/2025  Patient Name: Jaciel Harmon    History of Presenting Illness     Chief Complaint   Patient presents with   • Laceration         History Provided By: Patient    Additional History (Context):   11:02 AM EDT  Jaciel Harmon is a 25 y.o. 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  Patient working outside struck his head against a tractor has a small laceration across the hairline of his forehead.  There is no active bleeding it stopped spontaneously.  He has no loss of conscious however he is here for evaluation CT scan evaluation for show no intracranial bleeding.  He has no headaches.  Some changes nausea vomiting.    Social History  Early smoker.  Rarely drinks alcohol.    Family History  Unremarkable family history.    PCP: Edmond Solares Jr., MD    No current facility-administered medications for this encounter.     Current Outpatient Medications   Medication Sig Dispense Refill   • apixaban (ELIQUIS) 5 MG TABS tablet Take 1 tablet by mouth 2 times daily     • ibuprofen (ADVIL;MOTRIN) 200 MG tablet Take 2 tablets by mouth         Past History     Past Medical History:  Past Medical History:   Diagnosis Date   • Vascular abnormality        Past Surgical History:  Past Surgical History:   Procedure Laterality Date   • OTHER SURGICAL HISTORY      BB removed from hand   • WISDOM TOOTH EXTRACTION         Family History:  Family History   Problem Relation Age of Onset   • No Known Problems Mother    • No Known Problems Father        Social History:  Social History     Tobacco Use   • Smoking status: Never   • Smokeless tobacco: Current   Substance Use Topics   • Alcohol use: Yes   • Drug use: Never       Allergies:  No Known Allergies      Physical Exam     Vitals:    06/13/25 0954   BP: (!) 144/72   Pulse: 94  anticoagulants    4. Klippel-Trenaunay syndrome        PLAN:  1. D/C Home  2.      Medication List        ASK your doctor about these medications      Eliquis 5 MG Tabs tablet  Generic drug: apixaban     ibuprofen 200 MG tablet  Commonly known as: ADVIL;MOTRIN            3. [unfilled]  _______________________________    This note was partially transcribed via voice recognition software. Although efforts have been made to catch any discrepancies, it may contain sound alike words, grammatical errors, or nonsensical words.

## 2025-06-13 NOTE — ED TRIAGE NOTES
Patient hit head on tractor while working, laceration to forehead with about 2cm laceration. Bleeding controlled but patient on eliquis at home and complaining of 5/10 headache.

## 2025-06-17 PROBLEM — Q27.9 VENOUS MALFORMATION: Status: ACTIVE | Noted: 2021-02-02

## 2025-07-03 ENCOUNTER — TRANSCRIBE ORDERS (OUTPATIENT)
Age: 25
End: 2025-07-03

## 2025-07-03 ENCOUNTER — HOSPITAL ENCOUNTER (OUTPATIENT)
Age: 25
Setting detail: SPECIMEN
Discharge: HOME | End: 2025-07-03
Payer: COMMERCIAL

## 2025-07-03 DIAGNOSIS — Q87.2 KLIPPEL-TRENAUNAY-WEBER SYNDROME: ICD-10-CM

## 2025-07-03 DIAGNOSIS — Z79.01 CHRONIC ANTICOAGULATION: ICD-10-CM

## 2025-07-03 DIAGNOSIS — Q27.9 VENOUS MALFORMATION: ICD-10-CM

## 2025-07-03 DIAGNOSIS — Z79.01 CHRONIC ANTICOAGULATION: Primary | ICD-10-CM

## 2025-07-03 LAB — D DIMER PPP FEU-MCNC: 4.62 UG/ML(FEU)

## 2025-07-03 PROCEDURE — 36415 COLL VENOUS BLD VENIPUNCTURE: CPT

## 2025-07-03 PROCEDURE — 85379 FIBRIN DEGRADATION QUANT: CPT
